# Patient Record
Sex: MALE | Race: WHITE | NOT HISPANIC OR LATINO | URBAN - METROPOLITAN AREA
[De-identification: names, ages, dates, MRNs, and addresses within clinical notes are randomized per-mention and may not be internally consistent; named-entity substitution may affect disease eponyms.]

---

## 2017-11-20 ENCOUNTER — EMERGENCY (EMERGENCY)
Facility: HOSPITAL | Age: 62
LOS: 1 days | Discharge: HOME | End: 2017-11-20
Attending: EMERGENCY MEDICINE | Admitting: EMERGENCY MEDICINE
Payer: MEDICAID

## 2017-11-20 VITALS
SYSTOLIC BLOOD PRESSURE: 150 MMHG | RESPIRATION RATE: 16 BRPM | DIASTOLIC BLOOD PRESSURE: 79 MMHG | HEART RATE: 100 BPM | TEMPERATURE: 99 F

## 2017-11-20 PROCEDURE — 99282 EMERGENCY DEPT VISIT SF MDM: CPT

## 2017-11-20 PROCEDURE — 99284 EMERGENCY DEPT VISIT MOD MDM: CPT

## 2017-11-20 NOTE — ED PROVIDER NOTE - NEUROLOGICAL, MLM
Alert and orientedx0, nonresponsive, no focal deficits, no motor deficits. exam limited by cooperation

## 2017-11-20 NOTE — ED PROVIDER NOTE - PROGRESS NOTE DETAILS
Pt seen by Ophthalmology resident. Exam limited by pt compliance. Resident to return with attending and will reexamine pt shortly. 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 They recommend dc with ocuflox, topical bacitracin, feliz drops,  and cornea followup tmrw.

## 2017-11-20 NOTE — ED PROVIDER NOTE - OBJECTIVE STATEMENT
62yM hx hypothyroid, CAD, HD, parkinson's, schizoaffective p/w 1d rt eye discharge, scratching to eye. Evaluated by MD and [facility] and noted to have rt corneal cloudiness, sent to ED to r/o keratitis vs. corneal abrasion. No other symptoms noted. Pt nonverbal, not answering questions, per [facility] staff this is pt's baseline sometimes.

## 2017-11-20 NOTE — ED PROVIDER NOTE - MEDICAL DECISION MAKING DETAILS
rt corneal haziness, unable to assess VA->ophtho consult r/o ant uveitis, keratitis, corneal abrasion

## 2017-11-20 NOTE — CONSULT NOTE ADULT - SUBJECTIVE AND OBJECTIVE BOX
[facility] Ophthalmology Consult Note    HPI: 62 y M hx hypothyroid, CAD, HD, parkinson's, schizoaffective p/w 1d redness OD. Largely nonverbal.    PMH: As above  Meds: None  POcHx (including surgeries/lasers/trauma):  None  Drops: None  FamHx: None  Social Hx: None  Allergies: NKDA    ROS:  General (neg), Vision (per HPI), Head and Neck (neg), Pulm (neg), CV (neg), GI (neg),  (neg), Musculoskeletal (neg), Skin/Integ (neg), Neuro (neg), Endocrine (neg), Heme (neg), All/Immuno (neg)    Mood and Affect Appropriate ( x ),  Oriented to Time, Place, and Person x 3 ( x )    Ophthalmology Exam    Visual acuity (sc): Unable to assess 2/2 nonverbal and limited cooperation  Pupils: PERRL OU  Ttono: STP OU  Extraocular movements (EOMs): Intact OU  Confrontational Visual Field (CVF):  Unable to assess 2/2 nonverbal and limited cooperation  Color Plates: Unable to assess 2/2 nonverbal and limited cooperation    Pen Light Exam (PLE)  External:  Flat OU  Lids/Lashes/Lacrimal Ducts: Flat OU    Sclera/Conjunctiva:  W+Q OU  Cornea: OD: 4-5mm central corneal bulge anteriorly, opacity, Rizzuti sign. OS 1mm central bulge anteriorly, no opactiy. No stained due to lack of cooperation.  Anterior Chamber: D+F OU  Iris:  Flat OU  Lens: Cataract OU    Fundus Exam: dilated with 1% tropicamide and 2.5% phenylephrine  Unable to assess 2/2 nonverbal and limited cooperation    Assessment: 62 y M hx hypothyroid, CAD, HD, parkinson's, schizoaffective p/w 1d redness OD. Very limited exam as patient is noncooperative and aggressive. OD has 1+ injection, 4-5mm central corneal bulge with opacity. Appears that patient has keratoconus with acute corneal hydrops OD. Cannot exclude infectious keratitis OD 2/2 limited exam. Likely old hydrops OS.    Plan:  Yury drops bid OD  Ocuflox q4hr OD  bacitracin ointment qhs OD    Follow-Up:  Patient should follow up with cornea tomorrow  [address]  [place] [address]  [phone]    S/D/W Dr Farley (attending) [facility] Ophthalmology Consult Note    HPI: 62 y M hx hypothyroid, CAD, HD, parkinson's, schizoaffective p/w 1d redness OD. Largely nonverbal.    PMH: As above  Meds: None  POcHx (including surgeries/lasers/trauma):  None  Drops: None  FamHx: None  Social Hx: None  Allergies: NKDA    ROS:  General (neg), Vision (per HPI), Head and Neck (neg), Pulm (neg), CV (neg), GI (neg),  (neg), Musculoskeletal (neg), Skin/Integ (neg), Neuro (neg), Endocrine (neg), Heme (neg), All/Immuno (neg)    Mood and Affect Appropriate ( x ),  Oriented to Time, Place, and Person x 3 ( x )    Ophthalmology Exam    Pt extremely combative.  Kicking and trying to hit.  Had to be held down by 3 people to be examined.  Would not cooperate with slit lamp exam or pen light exam.  External exam performed only.  Pt refused flourescein drops and refused DFE.    Visual acuity (sc): Unable to assess 2/2 nonverbal and limited cooperation. F and F OU  Pupils: PERRL OU  Ttono: STP OU  Extraocular movements (EOMs): Intact OU  Confrontational Visual Field (CVF):  Unable to assess 2/2 nonverbal and limited cooperation  Color Plates: Unable to assess 2/2 nonverbal and limited cooperation    Pen Light Exam (PLE)  External:  Flat OU  Lids/Lashes/Lacrimal Ducts: Flat OU    Sclera/Conjunctiva:  W+Q OU  Cornea: OD: 4-5mm central corneal bulge anteriorly, opacity, Rizzuti sign. OS 1mm central bulge anteriorly, no opactiy. No stained due to lack of cooperation.  Anterior Chamber: D+F OU  Iris:  Flat OU  Lens: Cataract OU    Fundus Exam: dilated with 1% tropicamide and 2.5% phenylephrine  Unable to assess 2/2 nonverbal and limited cooperation

## 2017-11-20 NOTE — ED PROVIDER NOTE - CARE PLAN
Principal Discharge DX:	Keratoconus, acute hydrops, right  Instructions for follow-up, activity and diet:	The patient was given verbal and written discharge instructions. Specifically, instructions when to return to the ED and when to seek follow-up from their pcp was discussed. Any specialty follow-up was discussed, including how to make an appointment.  Instructions were discussed in simple, plain language and was understood by the patient. The patient understands that should their symptoms worsen or any new symptoms arise, they should return to the ED immediately for further evaluation.

## 2017-11-20 NOTE — ED PROVIDER NOTE - EYES, MLM
unable to assess VA 2/2 poor pt compliance with exam, fluorescein stain revealed no abrasion but exam limted 2/2 poor compliance (pt squeezing eyes shut), bl eyes with minimal yellow-green crusting, rt eye with opaque haziness overlying cornea

## 2017-11-20 NOTE — CONSULT NOTE ADULT - ATTENDING COMMENTS
I have interviewed and examined the patient and reviewed the residents note including the history, exam, assessment, and plan.  I agree with the residents assessment and plan.    Assessment: 62 y M hx hypothyroid, CAD, HD, parkinson's, schizoaffective p/w 1d redness OD. Very limited exam as patient is noncooperative and aggressive. OD has 1+ injection, 4-5mm central corneal bulge with opacity. Appears that patient has likely keratoconus with acute corneal hydrops OD. Cannot exclude infectious keratitis OD 2/2 limited exam. Likely old hydrops OS.    Plan:  Yury drops bid OD  Ocuflox 6x/day OD  bacitracin ointment qhs OD  no eye rubbing    Follow-Up:  Patient should follow up with cornea tomorrow    Gina Ferraro MD

## 2017-11-20 NOTE — CONSULT NOTE ADULT - ASSESSMENT
Assessment: 62 y M hx hypothyroid, CAD, HD, parkinson's, schizoaffective p/w 1d redness OD. Very limited exam as patient is noncooperative and aggressive. OD has 1+ injection, 4-5mm central corneal bulge with opacity. Appears that patient has likely keratoconus with acute corneal hydrops OD. Cannot exclude infectious keratitis OD 2/2 limited exam. Likely old hydrops OS.    Plan:  Yury drops bid OD  Ocuflox 6x/day OD  bacitracin ointment qhs OD  no eye rubbing    Follow-Up:  Patient should follow up with cornea tomorrow  [address]  [place] [address]  [phone]    S/D/W Dr Farley (attending)

## 2017-11-22 ENCOUNTER — APPOINTMENT (OUTPATIENT)
Dept: OPHTHALMOLOGY | Facility: CLINIC | Age: 62
End: 2017-11-22

## 2017-11-22 PROBLEM — Z00.00 ENCOUNTER FOR PREVENTIVE HEALTH EXAMINATION: Status: ACTIVE | Noted: 2017-11-22

## 2017-11-30 ENCOUNTER — APPOINTMENT (OUTPATIENT)
Dept: OPHTHALMOLOGY | Facility: CLINIC | Age: 62
End: 2017-11-30

## 2018-08-24 ENCOUNTER — MOBILE ON CALL (OUTPATIENT)
Age: 63
End: 2018-08-24

## 2019-10-29 ENCOUNTER — EMERGENCY (EMERGENCY)
Facility: HOSPITAL | Age: 64
LOS: 1 days | Discharge: HOME | End: 2019-10-29
Attending: HOSPITALIST | Admitting: HOSPITALIST
Payer: MEDICAID

## 2019-10-29 VITALS
RESPIRATION RATE: 16 BRPM | DIASTOLIC BLOOD PRESSURE: 58 MMHG | OXYGEN SATURATION: 100 % | SYSTOLIC BLOOD PRESSURE: 98 MMHG | TEMPERATURE: 99 F | HEART RATE: 66 BPM

## 2019-10-29 LAB
ALBUMIN SERPL ELPH-MCNC: 3.8 G/DL — SIGNIFICANT CHANGE UP (ref 3.3–5)
ALP SERPL-CCNC: 58 U/L — SIGNIFICANT CHANGE UP (ref 40–120)
ALT FLD-CCNC: 12 U/L — SIGNIFICANT CHANGE UP (ref 4–41)
ANION GAP SERPL CALC-SCNC: 11 MMO/L — SIGNIFICANT CHANGE UP (ref 7–14)
AST SERPL-CCNC: 17 U/L — SIGNIFICANT CHANGE UP (ref 4–40)
BASE EXCESS BLDV CALC-SCNC: 2.6 MMOL/L — SIGNIFICANT CHANGE UP
BASE EXCESS BLDV CALC-SCNC: 6.8 MMOL/L — SIGNIFICANT CHANGE UP
BASOPHILS # BLD AUTO: 0.01 K/UL — SIGNIFICANT CHANGE UP (ref 0–0.2)
BASOPHILS NFR BLD AUTO: 0.2 % — SIGNIFICANT CHANGE UP (ref 0–2)
BILIRUB SERPL-MCNC: 0.2 MG/DL — SIGNIFICANT CHANGE UP (ref 0.2–1.2)
BLOOD GAS VENOUS - CREATININE: 0.66 MG/DL — SIGNIFICANT CHANGE UP (ref 0.5–1.3)
BLOOD GAS VENOUS - CREATININE: SIGNIFICANT CHANGE UP MG/DL (ref 0.5–1.3)
BUN SERPL-MCNC: 21 MG/DL — SIGNIFICANT CHANGE UP (ref 7–23)
CALCIUM SERPL-MCNC: 8.9 MG/DL — SIGNIFICANT CHANGE UP (ref 8.4–10.5)
CHLORIDE BLDV-SCNC: 102 MMOL/L — SIGNIFICANT CHANGE UP (ref 96–108)
CHLORIDE BLDV-SCNC: 108 MMOL/L — SIGNIFICANT CHANGE UP (ref 96–108)
CHLORIDE SERPL-SCNC: 101 MMOL/L — SIGNIFICANT CHANGE UP (ref 98–107)
CO2 SERPL-SCNC: 27 MMOL/L — SIGNIFICANT CHANGE UP (ref 22–31)
CREAT SERPL-MCNC: 0.72 MG/DL — SIGNIFICANT CHANGE UP (ref 0.5–1.3)
EOSINOPHIL # BLD AUTO: 0.09 K/UL — SIGNIFICANT CHANGE UP (ref 0–0.5)
EOSINOPHIL NFR BLD AUTO: 1.7 % — SIGNIFICANT CHANGE UP (ref 0–6)
GAS PNL BLDV: 134 MMOL/L — LOW (ref 136–146)
GAS PNL BLDV: 139 MMOL/L — SIGNIFICANT CHANGE UP (ref 136–146)
GLUCOSE BLDV-MCNC: 89 MG/DL — SIGNIFICANT CHANGE UP (ref 70–99)
GLUCOSE BLDV-MCNC: 93 MG/DL — SIGNIFICANT CHANGE UP (ref 70–99)
GLUCOSE SERPL-MCNC: 112 MG/DL — HIGH (ref 70–99)
HCO3 BLDV-SCNC: 26 MMOL/L — SIGNIFICANT CHANGE UP (ref 20–27)
HCO3 BLDV-SCNC: 28 MMOL/L — HIGH (ref 20–27)
HCT VFR BLD CALC: 39 % — SIGNIFICANT CHANGE UP (ref 39–50)
HCT VFR BLDV CALC: 36.3 % — LOW (ref 39–51)
HCT VFR BLDV CALC: 39.3 % — SIGNIFICANT CHANGE UP (ref 39–51)
HGB BLD-MCNC: 12.2 G/DL — LOW (ref 13–17)
HGB BLDV-MCNC: 11.8 G/DL — LOW (ref 13–17)
HGB BLDV-MCNC: 12.8 G/DL — LOW (ref 13–17)
IMM GRANULOCYTES NFR BLD AUTO: 0.2 % — SIGNIFICANT CHANGE UP (ref 0–1.5)
LACTATE BLDV-MCNC: 1.9 MMOL/L — SIGNIFICANT CHANGE UP (ref 0.5–2)
LACTATE BLDV-MCNC: 2.2 MMOL/L — HIGH (ref 0.5–2)
LYMPHOCYTES # BLD AUTO: 1.24 K/UL — SIGNIFICANT CHANGE UP (ref 1–3.3)
LYMPHOCYTES # BLD AUTO: 23.5 % — SIGNIFICANT CHANGE UP (ref 13–44)
MCHC RBC-ENTMCNC: 29 PG — SIGNIFICANT CHANGE UP (ref 27–34)
MCHC RBC-ENTMCNC: 31.3 % — LOW (ref 32–36)
MCV RBC AUTO: 92.9 FL — SIGNIFICANT CHANGE UP (ref 80–100)
MONOCYTES # BLD AUTO: 0.69 K/UL — SIGNIFICANT CHANGE UP (ref 0–0.9)
MONOCYTES NFR BLD AUTO: 13.1 % — SIGNIFICANT CHANGE UP (ref 2–14)
NEUTROPHILS # BLD AUTO: 3.24 K/UL — SIGNIFICANT CHANGE UP (ref 1.8–7.4)
NEUTROPHILS NFR BLD AUTO: 61.3 % — SIGNIFICANT CHANGE UP (ref 43–77)
NRBC # FLD: 0 K/UL — SIGNIFICANT CHANGE UP (ref 0–0)
PCO2 BLDV: 48 MMHG — SIGNIFICANT CHANGE UP (ref 41–51)
PCO2 BLDV: 62 MMHG — HIGH (ref 41–51)
PH BLDV: 7.34 PH — SIGNIFICANT CHANGE UP (ref 7.32–7.43)
PH BLDV: 7.38 PH — SIGNIFICANT CHANGE UP (ref 7.32–7.43)
PLATELET # BLD AUTO: 138 K/UL — LOW (ref 150–400)
PMV BLD: 10.3 FL — SIGNIFICANT CHANGE UP (ref 7–13)
PO2 BLDV: 25 MMHG — LOW (ref 35–40)
PO2 BLDV: 44 MMHG — HIGH (ref 35–40)
POTASSIUM BLDV-SCNC: 5.2 MMOL/L — HIGH (ref 3.4–4.5)
POTASSIUM BLDV-SCNC: SIGNIFICANT CHANGE UP MMOL/L (ref 3.4–4.5)
POTASSIUM SERPL-MCNC: 4.8 MMOL/L — SIGNIFICANT CHANGE UP (ref 3.5–5.3)
POTASSIUM SERPL-SCNC: 4.8 MMOL/L — SIGNIFICANT CHANGE UP (ref 3.5–5.3)
PROT SERPL-MCNC: 6.7 G/DL — SIGNIFICANT CHANGE UP (ref 6–8.3)
RBC # BLD: 4.2 M/UL — SIGNIFICANT CHANGE UP (ref 4.2–5.8)
RBC # FLD: 14.2 % — SIGNIFICANT CHANGE UP (ref 10.3–14.5)
SAO2 % BLDV: 36.4 % — LOW (ref 60–85)
SAO2 % BLDV: 80 % — SIGNIFICANT CHANGE UP (ref 60–85)
SODIUM SERPL-SCNC: 139 MMOL/L — SIGNIFICANT CHANGE UP (ref 135–145)
WBC # BLD: 5.28 K/UL — SIGNIFICANT CHANGE UP (ref 3.8–10.5)
WBC # FLD AUTO: 5.28 K/UL — SIGNIFICANT CHANGE UP (ref 3.8–10.5)

## 2019-10-29 PROCEDURE — 99284 EMERGENCY DEPT VISIT MOD MDM: CPT

## 2019-10-29 RX ORDER — SODIUM CHLORIDE 9 MG/ML
1000 INJECTION INTRAMUSCULAR; INTRAVENOUS; SUBCUTANEOUS ONCE
Refills: 0 | Status: COMPLETED | OUTPATIENT
Start: 2019-10-29 | End: 2019-10-29

## 2019-10-29 RX ADMIN — SODIUM CHLORIDE 1000 MILLILITER(S): 9 INJECTION INTRAMUSCULAR; INTRAVENOUS; SUBCUTANEOUS at 19:38

## 2019-10-29 RX ADMIN — Medication 100 MILLIGRAM(S): at 18:25

## 2019-10-29 NOTE — ED ADULT NURSE REASSESSMENT NOTE - NS ED NURSE REASSESS COMMENT FT1
Patient with 2 staff members at his bedside. Medicated as ordered with Clindamycin , NS infusing well. Patient appears in no distress. Patient was incontinent of liquid stool x 1.

## 2019-10-29 NOTE — ED ADULT TRIAGE NOTE - CHIEF COMPLAINT QUOTE
A&OX1 person, PMH schizophrenia and parkinson's, arrived from [facility] Inpt unit 6b with aides (2:1), c/o left lower leg cellulitis, left lower leg appears red  calm in triage,

## 2019-10-29 NOTE — ED PROVIDER NOTE - NSFOLLOWUPINSTRUCTIONS_ED_ALL_ED_FT
If you experience increase pain or redness, fever, chills, purulent drainage to the site return to the ED immediately. Please take antibiotic as prescribed

## 2019-10-29 NOTE — ED ADULT NURSE NOTE - CHIEF COMPLAINT QUOTE
A&OX1 person, PMH schizophrenia and parkinson's, arrived from [facility] Inpt unit 6b with aides (2:1), c/o left lower leg cellulitis, left lower leg appears red  calm in triage,

## 2019-10-29 NOTE — ED ADULT NURSE NOTE - ED STAT RN HANDOFF DETAILS
report given to [facility] wagner. pt baseline emntal status. nad noted. dc accompanied by 2 [facility] staff.

## 2019-10-29 NOTE — ED PROVIDER NOTE - PATIENT PORTAL LINK FT
You can access the FollowMyHealth Patient Portal offered by [facility] by registering at the following website: [email or web address]. By joining [facility]’s FollowMyHealth portal, you will also be able to view your health information using other applications (apps) compatible with our system.

## 2019-10-29 NOTE — ED PROVIDER NOTE - OBJECTIVE STATEMENT
63yo M hx schizophrenia, parkinsons, hld, hypothyroid, AAOx1 baseline sent in from [facility] for cellulitis of dorsum of L foot noticed several days ago not improving with augmentin PO x 2 days. No fever, chills, decreased PO, change in mental status, n/v/d, or difficulty walking.

## 2019-10-29 NOTE — ED PROVIDER NOTE - PHYSICAL EXAMINATION
Gen: discheveled appearing, NAD  Head: NCAT  HEENT: PERRL, MMM, normal conjunctiva, anicteric, neck supple  Lung: CTAB, no adventitious sounds  CV: RRR, no murmurs  Abd: soft, NTND, no rebound or guarding, no CVAT  MSK: No edema, no visible deformities  Neuro: Moving all extremity grossly  Skin: Warm and dry, 1x1cm crusted lesion/ ?ulceration on L foot w/ surrounding erythema and mild induration to majority of dorsum of foot  Psych: normal mood and affect

## 2019-10-29 NOTE — ED PROVIDER NOTE - CLINICAL SUMMARY MEDICAL DECISION MAKING FREE TEXT BOX
Sent in for cellulitis not improving on augmentin x 2 days, nonpurulent cellulitis on foot. Mildly low bp but other vitals wnl. Labs, fluids, IV abx. If wnl and bp normalizes possibly dc otherwise admit for failure of PO abx

## 2019-10-29 NOTE — ED ADULT NURSE NOTE - OBJECTIVE STATEMENT
A&OX1 person, PMH schizophrenia and parkinson's, arrived from [facility] Inpt unit 6b with aides (2:1), c/o left lower leg cellulitis, left lower leg appears red  calm in triage,  cellulitis  Pt to room 14 with 2 aides at bedside. Pt is alert And active. Pt evaluated by MD. IVL placed to left hand 20 gauge and labs drawn and sent. IVL wrapped for safety placement. Pt waiting for results, further evaluation and dispo. Pt's left lower foot as well as leg has swelling and a couple of small ulcers. Will continue to monitor. Pt calm and cooperative at this time.    VITOR Macias

## 2019-10-29 NOTE — ED PROVIDER NOTE - PROGRESS NOTE DETAILS
PA Smartt: after liter of fluid lactate improved to 1.9. patient will be d/c back to [facility] with prescription for abx PA Smartt: after liter of fluid lactate improved to 1.9. patient will be d/c back to [facility] with prescription for abx. He is accompanied by peer counselors. social work with be contacted for discharge huddle

## 2019-10-29 NOTE — ED PROVIDER NOTE - ATTENDING CONTRIBUTION TO CARE
64M with hx of parkinsons and schizophrenia sent from inpatient [facility] 64M with hx of Parkinson's and schizophrenia sent from inpatient [facility] for cellulitis of left foot. patient treated with po Augmentin w/o improvement. no fevers, patient unable to contribute to HPI.  ***GEN - NAD; AAOx1 ***HEAD - NC/AT ***EYES/NOSE - PERRL, EOMI, mucous membranes moist, no discharge ***THROAT: Oral cavity and pharynx normal. No inflammation, swelling, exudate, or lesions.  ***NECK: Neck supple, non-tender without lymphadenopathy, no masses, no thyromegaly.   ***PULMONARY - CTA b/l, symmetric breath sounds. ***CARDIAC -s1s2, RRR, no M,G,R  ***ABDOMEN - +BS, ND, NT, soft, no guarding, no rebound, no masses   ***BACK - no CVA tenderness, Normal  spine ***EXTREMITIES - symmetric pulses, 2+ dp, capillary refill < 2 seconds, no clubbing, no cyanosis, no edema ***SKIN - redness and warmth to dorsum of left foot  ***NEUROLOGIC - alert, CN 2-12 intact, ***PSYCH - cooperative  MDM: 64M woth worsening cellulitis despite augmentin. xrays, abx., labs

## 2019-10-30 VITALS
OXYGEN SATURATION: 100 % | HEART RATE: 67 BPM | DIASTOLIC BLOOD PRESSURE: 80 MMHG | RESPIRATION RATE: 16 BRPM | SYSTOLIC BLOOD PRESSURE: 128 MMHG

## 2019-10-30 PROBLEM — I25.10 ATHEROSCLEROTIC HEART DISEASE OF NATIVE CORONARY ARTERY WITHOUT ANGINA PECTORIS: Chronic | Status: ACTIVE | Noted: 2017-11-20

## 2019-10-30 PROBLEM — G20 PARKINSON'S DISEASE: Chronic | Status: ACTIVE | Noted: 2017-11-20

## 2019-10-30 PROBLEM — F25.9 SCHIZOAFFECTIVE DISORDER, UNSPECIFIED: Chronic | Status: ACTIVE | Noted: 2017-11-20

## 2021-06-19 ENCOUNTER — EMERGENCY (EMERGENCY)
Facility: HOSPITAL | Age: 66
LOS: 1 days | Discharge: HOME | End: 2021-06-19
Attending: EMERGENCY MEDICINE | Admitting: EMERGENCY MEDICINE
Payer: MEDICAID

## 2021-06-19 VITALS
SYSTOLIC BLOOD PRESSURE: 112 MMHG | TEMPERATURE: 98 F | HEART RATE: 71 BPM | OXYGEN SATURATION: 98 % | DIASTOLIC BLOOD PRESSURE: 61 MMHG | RESPIRATION RATE: 18 BRPM

## 2021-06-19 VITALS
TEMPERATURE: 98 F | HEART RATE: 61 BPM | OXYGEN SATURATION: 96 % | SYSTOLIC BLOOD PRESSURE: 110 MMHG | DIASTOLIC BLOOD PRESSURE: 62 MMHG | RESPIRATION RATE: 17 BRPM

## 2021-06-19 LAB
ANION GAP SERPL CALC-SCNC: 14 MMOL/L — SIGNIFICANT CHANGE UP (ref 7–14)
BUN SERPL-MCNC: 14 MG/DL — SIGNIFICANT CHANGE UP (ref 7–23)
CALCIUM SERPL-MCNC: 8.7 MG/DL — SIGNIFICANT CHANGE UP (ref 8.4–10.5)
CHLORIDE SERPL-SCNC: 93 MMOL/L — LOW (ref 98–107)
CO2 SERPL-SCNC: 27 MMOL/L — SIGNIFICANT CHANGE UP (ref 22–31)
CREAT SERPL-MCNC: 0.68 MG/DL — SIGNIFICANT CHANGE UP (ref 0.5–1.3)
GLUCOSE SERPL-MCNC: 87 MG/DL — SIGNIFICANT CHANGE UP (ref 70–99)
HCT VFR BLD CALC: 36.9 % — LOW (ref 39–50)
HGB BLD-MCNC: 12.3 G/DL — LOW (ref 13–17)
MCHC RBC-ENTMCNC: 29.1 PG — SIGNIFICANT CHANGE UP (ref 27–34)
MCHC RBC-ENTMCNC: 33.3 GM/DL — SIGNIFICANT CHANGE UP (ref 32–36)
MCV RBC AUTO: 87.4 FL — SIGNIFICANT CHANGE UP (ref 80–100)
NRBC # BLD: 0 /100 WBCS — SIGNIFICANT CHANGE UP
NRBC # FLD: 0 K/UL — SIGNIFICANT CHANGE UP
PLATELET # BLD AUTO: 177 K/UL — SIGNIFICANT CHANGE UP (ref 150–400)
POTASSIUM SERPL-MCNC: 4.4 MMOL/L — SIGNIFICANT CHANGE UP (ref 3.5–5.3)
POTASSIUM SERPL-SCNC: 4.4 MMOL/L — SIGNIFICANT CHANGE UP (ref 3.5–5.3)
RBC # BLD: 4.22 M/UL — SIGNIFICANT CHANGE UP (ref 4.2–5.8)
RBC # FLD: 13.9 % — SIGNIFICANT CHANGE UP (ref 10.3–14.5)
SODIUM SERPL-SCNC: 134 MMOL/L — LOW (ref 135–145)
WBC # BLD: 6.61 K/UL — SIGNIFICANT CHANGE UP (ref 3.8–10.5)
WBC # FLD AUTO: 6.61 K/UL — SIGNIFICANT CHANGE UP (ref 3.8–10.5)

## 2021-06-19 PROCEDURE — 99284 EMERGENCY DEPT VISIT MOD MDM: CPT

## 2021-06-19 PROCEDURE — 70450 CT HEAD/BRAIN W/O DYE: CPT | Mod: 26

## 2021-06-19 RX ADMIN — Medication 2 MILLIGRAM(S): at 18:07

## 2021-06-19 NOTE — PROVIDER CONTACT NOTE (OTHER) - ASSESSMENT
Pt is being d/c, [facility] staff at bedside.  Called [facility] at [phone]; spoke with RN Julia; who reports she will speak with MD and get back to  re: provider hand off. Pt is being d/c, [facility] staff at bedside.  Called [facility] at [phone]; spoke with RN Julia; who reports she will speak with MD and get back to  re: provider hand off.  AS per [facility], provider is Dr. Simmons [phone]; Pt is being d/c, [facility] staff at bedside.  Called [facility] at [phone]; spoke with RN Julia; who reports she will speak with MD and get back to  re: provider hand off.  As per [facility], provider is Dr. Simmons [phone];  who accepted pt back to facility.  Discussed with provider, RN, and staff at bedside.

## 2021-06-19 NOTE — ED PROVIDER NOTE - ATTENDING CONTRIBUTION TO CARE
Attending note:   After face to face evaluation of this patient, I concur with above noted hx, pe, and care plan for this patient.  Tate: 66 yom with dementia and Parkinson's and schizophrenia, inpatient at [facility]. Pt noted to have abrasion on nose and left ear and EMS called. Initially noted to have low BP. EMS noted normal BP and BP here normal. Pt has no complaints but cannot provide answers to any questions. On exam, old bruise on left side of forehead and abrasions noted. FROM on neck and no tn, NC/AT, clear lungs, RRR, abd soft and non tender, moving all ext well, no edema, pulses equal and strong. CT head, labs, monitor BP.

## 2021-06-19 NOTE — ED PROVIDER NOTE - NSFOLLOWUPINSTRUCTIONS_ED_ALL_ED_FT
You were seen for suspected head trauma.     No signs of emergency medical condition on today's workup.  Your results are attached with your discharge instructions, please review them with your primary care physician. If there is a result pending, you will receive a call if test is positive.    A presumptive diagnosis is made today, but further evaluation may be required by your primary care doctor and/or specialist for a definitive diagnosis. Therefore, follow up as directed and if symptoms change/worsen or any emergency conditions, please return to the ER.    For pain or fever you can ibuprofen (motrin, advil) or tylenol as needed, as directed on packaging.    If needed, call patient access services at [phone] to find a primary care doctor, or call at [phone] to make an appointment at the clinic.
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
## 2021-06-19 NOTE — PROVIDER CONTACT NOTE (OTHER) - SITUATION
Notified by provider that pt is ready for d/c; pt is returning to [facility] Notified by provider that pt is ready for d/c; pt is returning to [facility], unit 6B  [phone].

## 2021-06-19 NOTE — PROVIDER CONTACT NOTE (OTHER) - RECOMMENDATIONS
Transport to be arranged by [facility] Transport to be arranged by [facility] via staff at bedside, Mr. Burden.  Verbal huddle complete.

## 2021-06-19 NOTE — ED PROVIDER NOTE - CLINICAL SUMMARY MEDICAL DECISION MAKING FREE TEXT BOX
65 yo Mw/ a PMHx of dementia (verbal, but nonsensical), schizophrenia, Parkinson's, HLD, hypothyroid, sent in from [facility] after they noted L ear contusion w/ abrasion and hypotension (88/54). Patient normotensive and afebrile here. Check CBC, BMP, UA, CTH.

## 2021-06-19 NOTE — ED ADULT NURSE NOTE - OBJECTIVE STATEMENT
Pt received to room 16. Pt comes to ED from [facility], 1 staff member at bedside, with a small abrasion noted to pt's nose. Pt unable to verbalize how he got the small abrasion. Staff member at bedside does not know if pt fell. Pt is verbally and physically aggressive with ED staff when attempting vital signs and blood draw. Security called to bedside. Respirations are even & unlabored.

## 2021-06-19 NOTE — ED PROVIDER NOTE - PROGRESS NOTE DETAILS
Charlotte Ellison DO PGY-2: pt received as a sign out. No new complains at this time. UA was ordered by the day team because pt was hypotensive in the facility. Pt has been normotensive in the ER. Plan at sign out - only pendind CT head - if negative dc home. As per the staff member pt never had mental status change.   Will dc patient. SW is contacted.

## 2021-06-19 NOTE — ED ADULT NURSE NOTE - NSIMPLEMENTINTERV_GEN_ALL_ED
Implemented All Fall Risk Interventions:  [place] to call system. Call bell, personal items and telephone within reach. Instruct patient to call for assistance. Room bathroom lighting operational. Non-slip footwear when patient is off stretcher. Physically safe environment: no spills, clutter or unnecessary equipment. Stretcher in lowest position, wheels locked, appropriate side rails in place. Provide visual cue, wrist band, yellow gown, etc. Monitor gait and stability. Monitor for mental status changes and reorient to person, place, and time. Review medications for side effects contributing to fall risk. Reinforce activity limits and safety measures with patient and family.

## 2021-06-19 NOTE — PROVIDER CONTACT NOTE (OTHER) - BACKGROUND
Pt currently resides at [facility] Unit [address]; transportation back is needed Pt currently resides at [facility] inpatient Unit 6B; transportation back is needed; Staff at bedside, Mr. Burden will accompany pt back to [facility].

## 2021-06-19 NOTE — ED PROVIDER NOTE - OBJECTIVE STATEMENT
65 yo Mw/ a PMHx of dementia (verbal, but nonsensical), schizophrenia, Parkinson's, HLD, hypothyroid, sent in from [facility] after they noted L ear contusion w/ abrasion and hypotension (88/54). Spoke to [facility] worker, it is unknown if he fell. Upon interview, he is not speaking relevant to the conversation which the worker notes is chronic stable baseline.

## 2021-06-19 NOTE — ED PROVIDER NOTE - PATIENT PORTAL LINK FT
You can access the FollowMyHealth Patient Portal offered by [facility] by registering at the following website: [email or web address]. By joining [facility]’s FollowMyHealth portal, you will also be able to view your health information using other applications (apps) compatible with our system.
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
## 2021-06-19 NOTE — ED ADULT NURSE REASSESSMENT NOTE - NS ED NURSE REASSESS COMMENT FT1
Pt escorted via wheelchair to ED entrance where transportation provided by [facility] was waiting for pt. [facility] staff accompanied the patient.
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
## 2022-03-11 NOTE — ED PROVIDER NOTE - ATTENDING CONTRIBUTION TO CARE
agree with resident note  62yM hx hypothyroid, CAD, HD, parkinson's, schizoaffective who presents from psychiatric facility for right eye discharge and possibly pain.  Pt not speaking on exam agitated will not allow us to examine him.  Able to place tetracaine and perform brief exam    PE: HR of 100; well appearing; not toxic; right eye (whitish hue in anterior chamber evidence on brief exam); no clear uptake; unable to perform visual acuity as pt not speaking or fundoscopic;     optho resident and attending at bedside they also can perform a limited exam and on their exam pt has keratoconus hydrops Azathioprine Pregnancy And Lactation Text: This medication is Pregnancy Category D and isn't considered safe during pregnancy. It is unknown if this medication is excreted in breast milk.

## 2022-12-15 ENCOUNTER — EMERGENCY (EMERGENCY)
Facility: HOSPITAL | Age: 67
LOS: 1 days | Discharge: HOME | End: 2022-12-15
Attending: EMERGENCY MEDICINE | Admitting: EMERGENCY MEDICINE

## 2022-12-15 VITALS
OXYGEN SATURATION: 97 % | DIASTOLIC BLOOD PRESSURE: 56 MMHG | RESPIRATION RATE: 18 BRPM | TEMPERATURE: 97 F | HEART RATE: 73 BPM | SYSTOLIC BLOOD PRESSURE: 109 MMHG

## 2022-12-15 PROCEDURE — 99283 EMERGENCY DEPT VISIT LOW MDM: CPT

## 2022-12-15 NOTE — ED PROVIDER NOTE - PATIENT PORTAL LINK FT
You can access the FollowMyHealth Patient Portal offered by [facility] by registering at the following website: [email or web address]. By joining [employer]’s FollowMyHealth portal, you will also be able to view your health information using other applications (apps) compatible with our system.

## 2022-12-15 NOTE — ED PROVIDER NOTE - NSICDXPASTMEDICALHX_GEN_ALL_CORE_FT
PAST MEDICAL HISTORY:  CAD (coronary artery disease)     Parkinson disease     Schizoaffective disorder
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
## 2022-12-15 NOTE — ED PROVIDER NOTE - PHYSICAL EXAMINATION
GENERAL: no acute distress, non-toxic appearing  HEAD: normocephalic, atraumatic  HEENT: normal conjunctiva, oral mucosa moist, neck supple  CARDIAC: alert at bedside  PULM: breathing comfortably at bedside  GI: abdomen nondistended, soft, nontender, no guarding or rebound tenderness  Rectal Exam: chaperoned with Dr. Chris: no rectum grossly unremarkable, no external signs of trauma, no bleeding or tears noted  : no CVA tenderness, no suprapubic tenderness  NEURO: alert and oriented x 0, word salad, unable to answer questions properly  MSK: no visible deformities, no peripheral edema, calf tenderness/redness/swelling  SKIN: no visible rashes, dry, well-perfused
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
## 2022-12-15 NOTE — ED ADULT NURSE NOTE - CHIEF COMPLAINT
reproducible rib pain possible msk/chostochondritis ; also notes pleuritic in nature will fu labs d-dimer 1 trop no fmhx of cad unlikely cardiac no risk factors no pericarditis on ekg will check esr/crp trop; no recent illness- tx with ibuprofen reassess
The patient is a 67y Male complaining of see chief complaint quote.
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
## 2022-12-15 NOTE — ED PROVIDER NOTE - PROGRESS NOTE DETAILS
Patient extensively discussed with Justice, ICU, and social work.  Social work coordinating care with Darren and a provider to provider handoff was given.  If patient clinical status changes, premature instructed to bring patient back to the emergency room for further evaluation.

## 2022-12-15 NOTE — ED PROVIDER NOTE - NSFOLLOWUPINSTRUCTIONS_ED_ALL_ED_FT
-You were seen in the Emergency Department (ED) for general medical exam. Lab and imaging results, if performed, were discussed with you along with your discharge diagnosis.    FOLLOW-UP:  -Please follow up with your PMD if symptoms return or for any concerning matter pertaining to your general medical exam.  -Please follow up with your private physician within the next 72 hours. Tell them you were recently in the ED for an urgent issue and would like to be seen. Bring copies of your results if you were given.   -If you do not have a PMD, please call [phone] to find one convenient for you or call our clinic at [phone].    MEDICATIONS:  -Continue all other prescribed medicine, IF ANY, as per your primary care doctor's (PMD) recommendations.    PAIN CONTROL:  -Please take over the counter Tylenol (also known as acetaminophen) 650mg every 6 hours or Ibuprofen (also known as motrin, advil) 600mg every 8 hour for your pain, IF ANY, unless you are not supposed to for any reason.  -Rest, stay hydrated with plenty of fluids (drink at least 2 Liters or 64 Ounces of water each day UNLESS you are supposed to restrict fluids or ANY reason.    RETURN PRECAUTIONS:  -Please return to the Emergency Department if you experience ANY new or concerning symptoms, such as, but not limited to: worsening pain, large amount of bleeding, passing out, fever >100.F, shaking chills, inability to see or new double vision, chest pain, difficulty breathing, diffuse abdominal pain, unable to eat or drink, continuous vomiting or diarrhea, unable to move or feel part of your body

## 2024-01-02 NOTE — ED PROVIDER NOTE - EYES [+], MLM
Cyndie Hassan MD Rejman, M Fp Nurse Msg Pool14 minutes ago (9:17 AM)     No labs needed      eye discharge, scratching eye

## 2024-11-02 ENCOUNTER — EMERGENCY (EMERGENCY)
Facility: HOSPITAL | Age: 69
LOS: 1 days | Discharge: HOME | End: 2024-11-02
Attending: STUDENT IN AN ORGANIZED HEALTH CARE EDUCATION/TRAINING PROGRAM | Admitting: STUDENT IN AN ORGANIZED HEALTH CARE EDUCATION/TRAINING PROGRAM
Payer: MEDICAID

## 2024-11-02 VITALS
DIASTOLIC BLOOD PRESSURE: 67 MMHG | TEMPERATURE: 98 F | OXYGEN SATURATION: 99 % | SYSTOLIC BLOOD PRESSURE: 115 MMHG | RESPIRATION RATE: 18 BRPM | HEART RATE: 85 BPM

## 2024-11-02 LAB
ALBUMIN SERPL ELPH-MCNC: 3.4 G/DL — SIGNIFICANT CHANGE UP (ref 3.3–5)
ALP SERPL-CCNC: 62 U/L — SIGNIFICANT CHANGE UP (ref 40–120)
ALT FLD-CCNC: 15 U/L — SIGNIFICANT CHANGE UP (ref 4–41)
ANION GAP SERPL CALC-SCNC: 10 MMOL/L — SIGNIFICANT CHANGE UP (ref 7–14)
AST SERPL-CCNC: 43 U/L — HIGH (ref 4–40)
BASOPHILS # BLD AUTO: 0.01 K/UL — SIGNIFICANT CHANGE UP (ref 0–0.2)
BASOPHILS NFR BLD AUTO: 0.2 % — SIGNIFICANT CHANGE UP (ref 0–2)
BILIRUB SERPL-MCNC: 0.2 MG/DL — SIGNIFICANT CHANGE UP (ref 0.2–1.2)
BUN SERPL-MCNC: 20 MG/DL — SIGNIFICANT CHANGE UP (ref 7–23)
CALCIUM SERPL-MCNC: 8.6 MG/DL — SIGNIFICANT CHANGE UP (ref 8.4–10.5)
CHLORIDE SERPL-SCNC: 99 MMOL/L — SIGNIFICANT CHANGE UP (ref 98–107)
CO2 SERPL-SCNC: 25 MMOL/L — SIGNIFICANT CHANGE UP (ref 22–31)
CREAT SERPL-MCNC: 0.75 MG/DL — SIGNIFICANT CHANGE UP (ref 0.5–1.3)
CRP SERPL-MCNC: 8 MG/L — HIGH
EGFR: 96 ML/MIN/1.73M2 — SIGNIFICANT CHANGE UP
EOSINOPHIL # BLD AUTO: 0.11 K/UL — SIGNIFICANT CHANGE UP (ref 0–0.5)
EOSINOPHIL NFR BLD AUTO: 1.7 % — SIGNIFICANT CHANGE UP (ref 0–6)
ERYTHROCYTE [SEDIMENTATION RATE] IN BLOOD: 8 MM/HR — SIGNIFICANT CHANGE UP (ref 1–15)
GLUCOSE SERPL-MCNC: 90 MG/DL — SIGNIFICANT CHANGE UP (ref 70–99)
HCT VFR BLD CALC: 35.5 % — LOW (ref 39–50)
HGB BLD-MCNC: 11.7 G/DL — LOW (ref 13–17)
IANC: 3.88 K/UL — SIGNIFICANT CHANGE UP (ref 1.8–7.4)
IMM GRANULOCYTES NFR BLD AUTO: 0.2 % — SIGNIFICANT CHANGE UP (ref 0–0.9)
LYMPHOCYTES # BLD AUTO: 1.7 K/UL — SIGNIFICANT CHANGE UP (ref 1–3.3)
LYMPHOCYTES # BLD AUTO: 26.6 % — SIGNIFICANT CHANGE UP (ref 13–44)
MCHC RBC-ENTMCNC: 28.4 PG — SIGNIFICANT CHANGE UP (ref 27–34)
MCHC RBC-ENTMCNC: 33 G/DL — SIGNIFICANT CHANGE UP (ref 32–36)
MCV RBC AUTO: 86.2 FL — SIGNIFICANT CHANGE UP (ref 80–100)
MONOCYTES # BLD AUTO: 0.69 K/UL — SIGNIFICANT CHANGE UP (ref 0–0.9)
MONOCYTES NFR BLD AUTO: 10.8 % — SIGNIFICANT CHANGE UP (ref 2–14)
NEUTROPHILS # BLD AUTO: 3.88 K/UL — SIGNIFICANT CHANGE UP (ref 1.8–7.4)
NEUTROPHILS NFR BLD AUTO: 60.5 % — SIGNIFICANT CHANGE UP (ref 43–77)
NRBC # BLD: 0 /100 WBCS — SIGNIFICANT CHANGE UP (ref 0–0)
NRBC # FLD: 0 K/UL — SIGNIFICANT CHANGE UP (ref 0–0)
PLATELET # BLD AUTO: 150 K/UL — SIGNIFICANT CHANGE UP (ref 150–400)
POTASSIUM SERPL-MCNC: 6.1 MMOL/L — HIGH (ref 3.5–5.3)
POTASSIUM SERPL-SCNC: 6.1 MMOL/L — HIGH (ref 3.5–5.3)
PROT SERPL-MCNC: 6.9 G/DL — SIGNIFICANT CHANGE UP (ref 6–8.3)
RBC # BLD: 4.12 M/UL — LOW (ref 4.2–5.8)
RBC # FLD: 14.3 % — SIGNIFICANT CHANGE UP (ref 10.3–14.5)
SODIUM SERPL-SCNC: 134 MMOL/L — LOW (ref 135–145)
WBC # BLD: 6.4 K/UL — SIGNIFICANT CHANGE UP (ref 3.8–10.5)
WBC # FLD AUTO: 6.4 K/UL — SIGNIFICANT CHANGE UP (ref 3.8–10.5)

## 2024-11-02 PROCEDURE — 93971 EXTREMITY STUDY: CPT | Mod: 26,LT

## 2024-11-02 PROCEDURE — 99285 EMERGENCY DEPT VISIT HI MDM: CPT

## 2024-11-02 PROCEDURE — 73630 X-RAY EXAM OF FOOT: CPT | Mod: 26,LT

## 2024-11-02 RX ORDER — HALOPERIDOL DECANOATE 50 MG/ML
5 INJECTION INTRAMUSCULAR ONCE
Refills: 0 | Status: COMPLETED | OUTPATIENT
Start: 2024-11-02 | End: 2024-11-02

## 2024-11-02 RX ORDER — CEFTRIAXONE SODIUM 10 G
1000 VIAL (EA) INJECTION ONCE
Refills: 0 | Status: COMPLETED | OUTPATIENT
Start: 2024-11-02 | End: 2024-11-02

## 2024-11-02 RX ORDER — LORAZEPAM 2 MG
2 TABLET ORAL ONCE
Refills: 0 | Status: DISCONTINUED | OUTPATIENT
Start: 2024-11-02 | End: 2024-11-02

## 2024-11-02 RX ADMIN — Medication 100 MILLIGRAM(S): at 21:33

## 2024-11-02 RX ADMIN — Medication 2 MILLIGRAM(S): at 22:21

## 2024-11-02 RX ADMIN — HALOPERIDOL DECANOATE 5 MILLIGRAM(S): 50 INJECTION INTRAMUSCULAR at 20:56

## 2024-11-02 NOTE — ED PROVIDER NOTE - PROGRESS NOTE DETAILS
Ivis Vu MD (PGY-3 EM): [facility] agreeable for dc w/ plan to give oral abx at [facility].
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
## 2024-11-02 NOTE — ED ADULT TRIAGE NOTE - CHIEF COMPLAINT QUOTE
Pt brought in from [facility], by EMS with c/o L foot swelling. Pt denies any injury. Hx of schizoaffective disorder and parkinson's. Pt unable to tolerate PO temperature.

## 2024-11-02 NOTE — ED ADULT NURSE NOTE - CHIEF COMPLAINT QUOTE
Pt brought in from [facility], by EMS with c/o L foot swelling. Pt denies any injury. Hx of schizoaffective disorder and parkinson's. Pt unable to tolerate PO temperature.

## 2024-11-02 NOTE — ED PROVIDER NOTE - NSFOLLOWUPINSTRUCTIONS_ED_ALL_ED_FT
YOU WERE FOUND TO HAVE CELLULITIS. PLEASE TAKE:    keflex 500mg, every 6hours, for 10 day.    Cellulitis    Cellulitis is a skin infection caused by bacteria. This condition occurs most often in the arms and lower legs but can occur anywhere over the body. Symptoms include redness, swelling, warm skin, tenderness, and chills/fever. If you were prescribed an antibiotic medicine, take it as told by your health care provider. Do not stop taking the antibiotic even if you start to feel better.    SEEK IMMEDIATE MEDICAL CARE IF YOU HAVE ANY OF THE FOLLOWING SYMPTOMS: worsening fever, red streaks coming from affected area, vomiting or diarrhea, or dizziness/lightheadedness.

## 2024-11-02 NOTE — ED ADULT NURSE NOTE - NSFALLRISKINTERV_ED_ALL_ED
Assistance OOB with selected safe patient handling equipment if applicable/Assistance with ambulation/Communicate fall risk and risk factors to all staff, patient, and family/Monitor gait and stability/Provide visual cue: yellow wristband, yellow gown, etc/Reinforce activity limits and safety measures with patient and family/Call bell, personal items and telephone in reach/Instruct patient to call for assistance before getting out of bed/chair/stretcher/Non-slip footwear applied when patient is off stretcher/[place] to call system/Physically safe environment - no spills, clutter or unnecessary equipment/Purposeful Proactive Rounding/Room/bathroom lighting operational, light cord in reach

## 2024-11-02 NOTE — ED PROVIDER NOTE - CLINICAL SUMMARY MEDICAL DECISION MAKING FREE TEXT BOX
69-year-old male past medical history significant for hypothyroidism, schizoaffective disorder, hyperlipidemia, atherosclerotic coronary artery disease, Parkinson's disease, dysphagia, frontotemporal dementia presents emergency department from [facility] secondary to left foot swelling. patient transferred to [facility] x 2 days ago and was noticed to have swelling. no known trauma. no history of dvt, not on blood thinners from chart review. history provided by triage note, [facility] staff, chart review. PE: patient seen at bedside, w/ [facility] staff, pleasently demented, incoherent, evidence of foot on shirt and mouth, able to range all ext, abd non tender, LLE > RLE with 1+ edema, peripheral pulses intact, no erythema. will get duplex to eval dvt. no need for labs at this time. no concern for cellulitis. 69-year-old male past medical history significant for hypothyroidism, schizoaffective disorder, hyperlipidemia, atherosclerotic coronary artery disease, Parkinson's disease, dysphagia, frontotemporal dementia presents emergency department from [facility] secondary to left foot swelling. patient transferred to [facility] x 2 days ago and was noticed to have swelling. no known trauma. no history of dvt, not on blood thinners from chart review. history provided by triage note, [facility] staff, chart review. PE: patient seen at bedside, w/ [facility] staff, pleasently demented, incoherent, evidence of foot on shirt and mouth, able to range all ext, abd non tender, LLE > RLE with 1+ edema, peripheral pulses intact, mild erythema to L#5 toe, with evidence of small chronic wound w/ scab.  will get duplex to eval dvt. will get screening labs, give abx for cellulitis, will get XR to eval frx. dispo pending workup. 69-year-old male past medical history significant for hypothyroidism, schizoaffective disorder, hyperlipidemia, atherosclerotic coronary artery disease, Parkinson's disease, dysphagia, frontotemporal dementia presents emergency department from [facility] secondary to left foot swelling. patient transferred to [facility] x 2 days ago and was noticed to have swelling. no known trauma. no history of dvt, not on blood thinners from chart review. history provided by triage note, [facility] staff, chart review. PE: patient seen at bedside, w/ [facility] staff, pleasantly demented, incoherent, evidence of foot on shirt and mouth, able to range all ext, evidence of large, non tender, soft, non erythematous mass on L elbow, abd non tender, LLE > RLE with 1+ edema, peripheral pulses intact, mild erythema to L#5 toe, with evidence of small chronic wound w/ scab.  will get duplex to eval dvt. will get screening labs, give abx for cellulitis, will get XR to eval frx. dispo pending workup.

## 2024-11-02 NOTE — ED PROVIDER NOTE - PATIENT PORTAL LINK FT
You can access the FollowMyHealth Patient Portal offered by [facility] by registering at the following website: [email or web address]. By joining [employer]’s FollowMyHealth portal, you will also be able to view your health information using other applications (apps) compatible with our system.

## 2024-11-03 LAB
ANION GAP SERPL CALC-SCNC: 10 MMOL/L — SIGNIFICANT CHANGE UP (ref 7–14)
BUN SERPL-MCNC: 18 MG/DL — SIGNIFICANT CHANGE UP (ref 7–23)
CALCIUM SERPL-MCNC: 8.4 MG/DL — SIGNIFICANT CHANGE UP (ref 8.4–10.5)
CHLORIDE SERPL-SCNC: 98 MMOL/L — SIGNIFICANT CHANGE UP (ref 98–107)
CO2 SERPL-SCNC: 27 MMOL/L — SIGNIFICANT CHANGE UP (ref 22–31)
CREAT SERPL-MCNC: 0.73 MG/DL — SIGNIFICANT CHANGE UP (ref 0.5–1.3)
EGFR: 97 ML/MIN/1.73M2 — SIGNIFICANT CHANGE UP
GLUCOSE SERPL-MCNC: 84 MG/DL — SIGNIFICANT CHANGE UP (ref 70–99)
POTASSIUM SERPL-MCNC: 4.5 MMOL/L — SIGNIFICANT CHANGE UP (ref 3.5–5.3)
POTASSIUM SERPL-SCNC: 4.5 MMOL/L — SIGNIFICANT CHANGE UP (ref 3.5–5.3)
SODIUM SERPL-SCNC: 135 MMOL/L — SIGNIFICANT CHANGE UP (ref 135–145)

## 2024-11-03 NOTE — PROVIDER CONTACT NOTE (OTHER) - ASSESSMENT
MSW made call to Pt's residence and spoke with the Nursing Supervisor Ms. Schreiber at [phone]. MIHAI was informed Pt can return to his residence at [address], unit contact number [phone], via ambulance.

## 2024-11-03 NOTE — PROVIDER CONTACT NOTE (OTHER) - ACTION/TREATMENT ORDERED:
[employer] [id number] arranged ambulance transportation for Pt with [facility]# was not provided to [facility] by [facility].  time is 230am.

## 2025-07-17 ENCOUNTER — INPATIENT (INPATIENT)
Facility: HOSPITAL | Age: 70
LOS: 4 days | Discharge: PSYCHIATRIC OR SUBSTANCE USE FACILITY | End: 2025-07-22
Attending: HOSPITALIST | Admitting: HOSPITALIST
Payer: MEDICAID

## 2025-07-17 VITALS
RESPIRATION RATE: 16 BRPM | DIASTOLIC BLOOD PRESSURE: 62 MMHG | HEART RATE: 67 BPM | SYSTOLIC BLOOD PRESSURE: 105 MMHG | OXYGEN SATURATION: 100 % | WEIGHT: 179.9 LBS | TEMPERATURE: 99 F

## 2025-07-17 PROCEDURE — 99285 EMERGENCY DEPT VISIT HI MDM: CPT

## 2025-07-18 DIAGNOSIS — F25.9 SCHIZOAFFECTIVE DISORDER, UNSPECIFIED: ICD-10-CM

## 2025-07-18 DIAGNOSIS — J69.0 PNEUMONITIS DUE TO INHALATION OF FOOD AND VOMIT: ICD-10-CM

## 2025-07-18 DIAGNOSIS — I25.10 ATHEROSCLEROTIC HEART DISEASE OF NATIVE CORONARY ARTERY WITHOUT ANGINA PECTORIS: ICD-10-CM

## 2025-07-18 DIAGNOSIS — G20 PARKINSON'S DISEASE: ICD-10-CM

## 2025-07-18 LAB
ADD ON TEST-SPECIMEN IN LAB: SIGNIFICANT CHANGE UP
ADD ON TEST-SPECIMEN IN LAB: SIGNIFICANT CHANGE UP
ALBUMIN SERPL ELPH-MCNC: 3.4 G/DL — SIGNIFICANT CHANGE UP (ref 3.3–5)
ALP SERPL-CCNC: 53 U/L — SIGNIFICANT CHANGE UP (ref 40–120)
ALT FLD-CCNC: 7 U/L — SIGNIFICANT CHANGE UP (ref 4–41)
ANION GAP SERPL CALC-SCNC: 12 MMOL/L — SIGNIFICANT CHANGE UP (ref 7–14)
APTT BLD: 30 SEC — SIGNIFICANT CHANGE UP (ref 26.1–36.8)
AST SERPL-CCNC: 13 U/L — SIGNIFICANT CHANGE UP (ref 4–40)
B PERT DNA SPEC QL NAA+PROBE: SIGNIFICANT CHANGE UP
B PERT+PARAPERT DNA PNL SPEC NAA+PROBE: SIGNIFICANT CHANGE UP
BASOPHILS # BLD AUTO: 0.01 K/UL — SIGNIFICANT CHANGE UP (ref 0–0.2)
BASOPHILS NFR BLD AUTO: 0.2 % — SIGNIFICANT CHANGE UP (ref 0–2)
BILIRUB SERPL-MCNC: 0.2 MG/DL — SIGNIFICANT CHANGE UP (ref 0.2–1.2)
BLOOD GAS VENOUS COMPREHENSIVE RESULT: SIGNIFICANT CHANGE UP
BUN SERPL-MCNC: 23 MG/DL — SIGNIFICANT CHANGE UP (ref 7–23)
C PNEUM DNA SPEC QL NAA+PROBE: SIGNIFICANT CHANGE UP
CALCIUM SERPL-MCNC: 8.4 MG/DL — SIGNIFICANT CHANGE UP (ref 8.4–10.5)
CHLORIDE SERPL-SCNC: 97 MMOL/L — LOW (ref 98–107)
CO2 SERPL-SCNC: 29 MMOL/L — SIGNIFICANT CHANGE UP (ref 22–31)
CREAT SERPL-MCNC: 0.7 MG/DL — SIGNIFICANT CHANGE UP (ref 0.5–1.3)
EGFR: 98 ML/MIN/1.73M2 — SIGNIFICANT CHANGE UP
EGFR: 98 ML/MIN/1.73M2 — SIGNIFICANT CHANGE UP
EOSINOPHIL # BLD AUTO: 0.01 K/UL — SIGNIFICANT CHANGE UP (ref 0–0.5)
EOSINOPHIL NFR BLD AUTO: 0.2 % — SIGNIFICANT CHANGE UP (ref 0–6)
FLUAV AG NPH QL: SIGNIFICANT CHANGE UP
FLUAV SUBTYP SPEC NAA+PROBE: SIGNIFICANT CHANGE UP
FLUBV AG NPH QL: SIGNIFICANT CHANGE UP
FLUBV RNA SPEC QL NAA+PROBE: SIGNIFICANT CHANGE UP
GLUCOSE SERPL-MCNC: 88 MG/DL — SIGNIFICANT CHANGE UP (ref 70–99)
HADV DNA SPEC QL NAA+PROBE: SIGNIFICANT CHANGE UP
HCOV 229E RNA SPEC QL NAA+PROBE: SIGNIFICANT CHANGE UP
HCOV HKU1 RNA SPEC QL NAA+PROBE: SIGNIFICANT CHANGE UP
HCOV NL63 RNA SPEC QL NAA+PROBE: SIGNIFICANT CHANGE UP
HCOV OC43 RNA SPEC QL NAA+PROBE: SIGNIFICANT CHANGE UP
HCT VFR BLD CALC: 35.3 % — LOW (ref 39–50)
HGB BLD-MCNC: 11.5 G/DL — LOW (ref 13–17)
HMPV RNA SPEC QL NAA+PROBE: DETECTED
HPIV1 RNA SPEC QL NAA+PROBE: SIGNIFICANT CHANGE UP
HPIV2 RNA SPEC QL NAA+PROBE: SIGNIFICANT CHANGE UP
HPIV3 RNA SPEC QL NAA+PROBE: SIGNIFICANT CHANGE UP
HPIV4 RNA SPEC QL NAA+PROBE: SIGNIFICANT CHANGE UP
IMM GRANULOCYTES # BLD AUTO: 0.02 K/UL — SIGNIFICANT CHANGE UP (ref 0–0.07)
IMM GRANULOCYTES NFR BLD AUTO: 0.4 % — SIGNIFICANT CHANGE UP (ref 0–0.9)
INR BLD: 1.08 RATIO — SIGNIFICANT CHANGE UP (ref 0.85–1.16)
LYMPHOCYTES # BLD AUTO: 0.98 K/UL — LOW (ref 1–3.3)
LYMPHOCYTES NFR BLD AUTO: 18.3 % — SIGNIFICANT CHANGE UP (ref 13–44)
M PNEUMO DNA SPEC QL NAA+PROBE: SIGNIFICANT CHANGE UP
MCHC RBC-ENTMCNC: 28.5 PG — SIGNIFICANT CHANGE UP (ref 27–34)
MCHC RBC-ENTMCNC: 32.6 G/DL — SIGNIFICANT CHANGE UP (ref 32–36)
MCV RBC AUTO: 87.4 FL — SIGNIFICANT CHANGE UP (ref 80–100)
MONOCYTES # BLD AUTO: 0.52 K/UL — SIGNIFICANT CHANGE UP (ref 0–0.9)
MONOCYTES NFR BLD AUTO: 9.7 % — SIGNIFICANT CHANGE UP (ref 2–14)
NEUTROPHILS # BLD AUTO: 3.82 K/UL — SIGNIFICANT CHANGE UP (ref 1.8–7.4)
NEUTROPHILS NFR BLD AUTO: 71.2 % — SIGNIFICANT CHANGE UP (ref 43–77)
NRBC # BLD AUTO: 0 K/UL — SIGNIFICANT CHANGE UP (ref 0–0)
NRBC # FLD: 0 K/UL — SIGNIFICANT CHANGE UP (ref 0–0)
NRBC BLD AUTO-RTO: 0 /100 WBCS — SIGNIFICANT CHANGE UP (ref 0–0)
NT-PROBNP SERPL-SCNC: 340 PG/ML — HIGH
PLATELET # BLD AUTO: 104 K/UL — LOW (ref 150–400)
PMV BLD: 10.7 FL — SIGNIFICANT CHANGE UP (ref 7–13)
POTASSIUM SERPL-MCNC: 4.4 MMOL/L — SIGNIFICANT CHANGE UP (ref 3.5–5.3)
POTASSIUM SERPL-SCNC: 4.4 MMOL/L — SIGNIFICANT CHANGE UP (ref 3.5–5.3)
PROCALCITONIN SERPL-MCNC: 0.14 NG/ML — HIGH (ref 0.02–0.1)
PROT SERPL-MCNC: 6.4 G/DL — SIGNIFICANT CHANGE UP (ref 6–8.3)
PROTHROM AB SERPL-ACNC: 12.5 SEC — SIGNIFICANT CHANGE UP (ref 9.9–13.4)
RAPID RVP RESULT: DETECTED
RBC # BLD: 4.04 M/UL — LOW (ref 4.2–5.8)
RBC # FLD: 14.2 % — SIGNIFICANT CHANGE UP (ref 10.3–14.5)
RSV RNA NPH QL NAA+NON-PROBE: SIGNIFICANT CHANGE UP
RSV RNA SPEC QL NAA+PROBE: SIGNIFICANT CHANGE UP
RV+EV RNA SPEC QL NAA+PROBE: SIGNIFICANT CHANGE UP
SARS-COV-2 RNA SPEC QL NAA+PROBE: SIGNIFICANT CHANGE UP
SARS-COV-2 RNA SPEC QL NAA+PROBE: SIGNIFICANT CHANGE UP
SODIUM SERPL-SCNC: 138 MMOL/L — SIGNIFICANT CHANGE UP (ref 135–145)
SOURCE RESPIRATORY: SIGNIFICANT CHANGE UP
TSH SERPL-MCNC: 1.87 UIU/ML — SIGNIFICANT CHANGE UP (ref 0.27–4.2)
WBC # BLD: 5.36 K/UL — SIGNIFICANT CHANGE UP (ref 3.8–10.5)
WBC # FLD AUTO: 5.36 K/UL — SIGNIFICANT CHANGE UP (ref 3.8–10.5)

## 2025-07-18 PROCEDURE — 99223 1ST HOSP IP/OBS HIGH 75: CPT

## 2025-07-18 PROCEDURE — 71045 X-RAY EXAM CHEST 1 VIEW: CPT | Mod: 26

## 2025-07-18 RX ORDER — PIPERACILLIN-TAZO-DEXTROSE,ISO 3.375G/5
3.38 IV SOLUTION, PIGGYBACK PREMIX FROZEN(ML) INTRAVENOUS EVERY 8 HOURS
Refills: 0 | Status: DISCONTINUED | OUTPATIENT
Start: 2025-07-18 | End: 2025-07-21

## 2025-07-18 RX ORDER — LEVOTHYROXINE SODIUM 300 MCG
1 TABLET ORAL
Refills: 0 | DISCHARGE

## 2025-07-18 RX ORDER — SERTRALINE 100 MG/1
25 TABLET, FILM COATED ORAL DAILY
Refills: 0 | Status: DISCONTINUED | OUTPATIENT
Start: 2025-07-18 | End: 2025-07-22

## 2025-07-18 RX ORDER — SERTRALINE 100 MG/1
1 TABLET, FILM COATED ORAL
Refills: 0 | DISCHARGE

## 2025-07-18 RX ORDER — FOLIC ACID 1 MG/1
1 TABLET ORAL DAILY
Refills: 0 | Status: DISCONTINUED | OUTPATIENT
Start: 2025-07-18 | End: 2025-07-19

## 2025-07-18 RX ORDER — HALOPERIDOL 10 MG/1
5 TABLET ORAL ONCE
Refills: 0 | Status: COMPLETED | OUTPATIENT
Start: 2025-07-18 | End: 2025-07-18

## 2025-07-18 RX ORDER — GABAPENTIN 400 MG/1
1 CAPSULE ORAL
Refills: 0 | DISCHARGE

## 2025-07-18 RX ORDER — OLANZAPINE 10 MG/1
1 TABLET ORAL
Refills: 0 | DISCHARGE

## 2025-07-18 RX ORDER — HALOPERIDOL 10 MG/1
1 TABLET ORAL
Refills: 0 | DISCHARGE

## 2025-07-18 RX ORDER — NALTREXONE HYDROCHLORIDE 50 MG/1
100 TABLET, FILM COATED ORAL DAILY
Refills: 0 | Status: DISCONTINUED | OUTPATIENT
Start: 2025-07-18 | End: 2025-07-22

## 2025-07-18 RX ORDER — SERTRALINE 100 MG/1
100 TABLET, FILM COATED ORAL DAILY
Refills: 0 | Status: DISCONTINUED | OUTPATIENT
Start: 2025-07-18 | End: 2025-07-22

## 2025-07-18 RX ORDER — FOLIC ACID 1 MG/1
1 TABLET ORAL DAILY
Refills: 0 | Status: DISCONTINUED | OUTPATIENT
Start: 2025-07-18 | End: 2025-07-18

## 2025-07-18 RX ORDER — ARIPIPRAZOLE 2 MG/1
30 TABLET ORAL DAILY
Refills: 0 | Status: DISCONTINUED | OUTPATIENT
Start: 2025-07-18 | End: 2025-07-22

## 2025-07-18 RX ORDER — FOLIC ACID 1 MG/1
1 TABLET ORAL
Refills: 0 | DISCHARGE

## 2025-07-18 RX ORDER — DIPHENHYDRAMINE HCL 12.5MG/5ML
1 ELIXIR ORAL
Refills: 0 | DISCHARGE

## 2025-07-18 RX ORDER — PIPERACILLIN-TAZO-DEXTROSE,ISO 3.375G/5
3.38 IV SOLUTION, PIGGYBACK PREMIX FROZEN(ML) INTRAVENOUS ONCE
Refills: 0 | Status: COMPLETED | OUTPATIENT
Start: 2025-07-18 | End: 2025-07-18

## 2025-07-18 RX ORDER — HALOPERIDOL 10 MG/1
10 TABLET ORAL DAILY
Refills: 0 | Status: DISCONTINUED | OUTPATIENT
Start: 2025-07-19 | End: 2025-07-22

## 2025-07-18 RX ORDER — ARIPIPRAZOLE 2 MG/1
1 TABLET ORAL
Refills: 0 | DISCHARGE

## 2025-07-18 RX ORDER — SENNA 187 MG
2 TABLET ORAL AT BEDTIME
Refills: 0 | Status: DISCONTINUED | OUTPATIENT
Start: 2025-07-18 | End: 2025-07-22

## 2025-07-18 RX ORDER — HALOPERIDOL 10 MG/1
5 TABLET ORAL AT BEDTIME
Refills: 0 | Status: DISCONTINUED | OUTPATIENT
Start: 2025-07-18 | End: 2025-07-22

## 2025-07-18 RX ORDER — UREA 10 %
1 LOTION (ML) TOPICAL
Refills: 0 | DISCHARGE

## 2025-07-18 RX ORDER — GABAPENTIN 400 MG/1
400 CAPSULE ORAL
Refills: 0 | Status: DISCONTINUED | OUTPATIENT
Start: 2025-07-18 | End: 2025-07-22

## 2025-07-18 RX ORDER — SENNA 187 MG
1 TABLET ORAL
Refills: 0 | DISCHARGE

## 2025-07-18 RX ORDER — LEVOTHYROXINE SODIUM 300 MCG
150 TABLET ORAL DAILY
Refills: 0 | Status: DISCONTINUED | OUTPATIENT
Start: 2025-07-18 | End: 2025-07-19

## 2025-07-18 RX ORDER — ENOXAPARIN SODIUM 100 MG/ML
40 INJECTION SUBCUTANEOUS EVERY 24 HOURS
Refills: 0 | Status: DISCONTINUED | OUTPATIENT
Start: 2025-07-18 | End: 2025-07-22

## 2025-07-18 RX ORDER — NALTREXONE HYDROCHLORIDE 50 MG/1
2 TABLET, FILM COATED ORAL
Refills: 0 | DISCHARGE

## 2025-07-18 RX ADMIN — Medication 25 GRAM(S): at 14:26

## 2025-07-18 RX ADMIN — HALOPERIDOL 5 MILLIGRAM(S): 10 TABLET ORAL at 21:55

## 2025-07-18 RX ADMIN — Medication 1000 MILLILITER(S): at 01:46

## 2025-07-18 RX ADMIN — Medication 200 GRAM(S): at 04:09

## 2025-07-18 RX ADMIN — Medication 25 GRAM(S): at 22:00

## 2025-07-19 LAB
ANION GAP SERPL CALC-SCNC: 14 MMOL/L — SIGNIFICANT CHANGE UP (ref 7–14)
BUN SERPL-MCNC: 15 MG/DL — SIGNIFICANT CHANGE UP (ref 7–23)
CALCIUM SERPL-MCNC: 8.5 MG/DL — SIGNIFICANT CHANGE UP (ref 8.4–10.5)
CHLORIDE SERPL-SCNC: 98 MMOL/L — SIGNIFICANT CHANGE UP (ref 98–107)
CO2 SERPL-SCNC: 24 MMOL/L — SIGNIFICANT CHANGE UP (ref 22–31)
CREAT SERPL-MCNC: 0.57 MG/DL — SIGNIFICANT CHANGE UP (ref 0.5–1.3)
EGFR: 104 ML/MIN/1.73M2 — SIGNIFICANT CHANGE UP
EGFR: 104 ML/MIN/1.73M2 — SIGNIFICANT CHANGE UP
GLUCOSE SERPL-MCNC: 77 MG/DL — SIGNIFICANT CHANGE UP (ref 70–99)
HCT VFR BLD CALC: 40.3 % — SIGNIFICANT CHANGE UP (ref 39–50)
HGB BLD-MCNC: 13.4 G/DL — SIGNIFICANT CHANGE UP (ref 13–17)
MAGNESIUM SERPL-MCNC: 2.3 MG/DL — SIGNIFICANT CHANGE UP (ref 1.6–2.6)
MCHC RBC-ENTMCNC: 28.9 PG — SIGNIFICANT CHANGE UP (ref 27–34)
MCHC RBC-ENTMCNC: 33.3 G/DL — SIGNIFICANT CHANGE UP (ref 32–36)
MCV RBC AUTO: 86.9 FL — SIGNIFICANT CHANGE UP (ref 80–100)
NRBC # BLD AUTO: 0 K/UL — SIGNIFICANT CHANGE UP (ref 0–0)
NRBC # FLD: 0 K/UL — SIGNIFICANT CHANGE UP (ref 0–0)
PHOSPHATE SERPL-MCNC: 3.3 MG/DL — SIGNIFICANT CHANGE UP (ref 2.5–4.5)
PLATELET # BLD AUTO: 78 K/UL — LOW (ref 150–400)
PMV BLD: SIGNIFICANT CHANGE UP FL (ref 7–13)
POTASSIUM SERPL-MCNC: 5.1 MMOL/L — SIGNIFICANT CHANGE UP (ref 3.5–5.3)
POTASSIUM SERPL-SCNC: 5.1 MMOL/L — SIGNIFICANT CHANGE UP (ref 3.5–5.3)
RBC # BLD: 4.64 M/UL — SIGNIFICANT CHANGE UP (ref 4.2–5.8)
RBC # FLD: SIGNIFICANT CHANGE UP % (ref 10.3–14.5)
SODIUM SERPL-SCNC: 136 MMOL/L — SIGNIFICANT CHANGE UP (ref 135–145)
WBC # BLD: 4.3 K/UL — SIGNIFICANT CHANGE UP (ref 3.8–10.5)
WBC # FLD AUTO: 4.3 K/UL — SIGNIFICANT CHANGE UP (ref 3.8–10.5)

## 2025-07-19 PROCEDURE — 99233 SBSQ HOSP IP/OBS HIGH 50: CPT

## 2025-07-19 RX ORDER — OLANZAPINE 10 MG/1
10 TABLET ORAL DAILY
Refills: 0 | Status: DISCONTINUED | OUTPATIENT
Start: 2025-07-19 | End: 2025-07-20

## 2025-07-19 RX ORDER — LEVOTHYROXINE SODIUM 300 MCG
150 TABLET ORAL DAILY
Refills: 0 | Status: DISCONTINUED | OUTPATIENT
Start: 2025-07-20 | End: 2025-07-22

## 2025-07-19 RX ORDER — LEVOTHYROXINE SODIUM 300 MCG
112 TABLET ORAL AT BEDTIME
Refills: 0 | Status: DISCONTINUED | OUTPATIENT
Start: 2025-07-19 | End: 2025-07-19

## 2025-07-19 RX ORDER — FOLIC ACID 1 MG/1
1 TABLET ORAL DAILY
Refills: 0 | Status: DISCONTINUED | OUTPATIENT
Start: 2025-07-19 | End: 2025-07-22

## 2025-07-19 RX ADMIN — Medication 25 GRAM(S): at 21:35

## 2025-07-19 RX ADMIN — SERTRALINE 25 MILLIGRAM(S): 100 TABLET, FILM COATED ORAL at 14:33

## 2025-07-19 RX ADMIN — Medication 25 GRAM(S): at 06:28

## 2025-07-19 RX ADMIN — FOLIC ACID 1 MILLIGRAM(S): 1 TABLET ORAL at 14:34

## 2025-07-19 RX ADMIN — Medication 55 MILLIGRAM(S): at 05:17

## 2025-07-19 RX ADMIN — NALTREXONE HYDROCHLORIDE 100 MILLIGRAM(S): 50 TABLET, FILM COATED ORAL at 14:33

## 2025-07-19 RX ADMIN — ARIPIPRAZOLE 30 MILLIGRAM(S): 2 TABLET ORAL at 14:33

## 2025-07-19 RX ADMIN — SERTRALINE 100 MILLIGRAM(S): 100 TABLET, FILM COATED ORAL at 14:33

## 2025-07-19 RX ADMIN — Medication 1000 MILLIGRAM(S): at 17:24

## 2025-07-19 RX ADMIN — GABAPENTIN 400 MILLIGRAM(S): 400 CAPSULE ORAL at 17:23

## 2025-07-19 RX ADMIN — Medication 25 GRAM(S): at 14:31

## 2025-07-19 RX ADMIN — HALOPERIDOL 10 MILLIGRAM(S): 10 TABLET ORAL at 14:32

## 2025-07-19 RX ADMIN — HALOPERIDOL 5 MILLIGRAM(S): 10 TABLET ORAL at 21:37

## 2025-07-19 RX ADMIN — ENOXAPARIN SODIUM 40 MILLIGRAM(S): 100 INJECTION SUBCUTANEOUS at 06:28

## 2025-07-19 RX ADMIN — Medication 55 MILLIGRAM(S): at 01:04

## 2025-07-19 RX ADMIN — Medication 2000 UNIT(S): at 14:33

## 2025-07-19 RX ADMIN — Medication 2 TABLET(S): at 21:37

## 2025-07-20 LAB
ALBUMIN SERPL ELPH-MCNC: 3.3 G/DL — SIGNIFICANT CHANGE UP (ref 3.3–5)
ALP SERPL-CCNC: 57 U/L — SIGNIFICANT CHANGE UP (ref 40–120)
ALT FLD-CCNC: 6 U/L — SIGNIFICANT CHANGE UP (ref 4–41)
ANION GAP SERPL CALC-SCNC: 13 MMOL/L — SIGNIFICANT CHANGE UP (ref 7–14)
ANION GAP SERPL CALC-SCNC: 14 MMOL/L — SIGNIFICANT CHANGE UP (ref 7–14)
APPEARANCE UR: CLEAR — SIGNIFICANT CHANGE UP
AST SERPL-CCNC: 13 U/L — SIGNIFICANT CHANGE UP (ref 4–40)
BILIRUB SERPL-MCNC: 0.2 MG/DL — SIGNIFICANT CHANGE UP (ref 0.2–1.2)
BILIRUB UR-MCNC: NEGATIVE — SIGNIFICANT CHANGE UP
BUN SERPL-MCNC: 10 MG/DL — SIGNIFICANT CHANGE UP (ref 7–23)
BUN SERPL-MCNC: 13 MG/DL — SIGNIFICANT CHANGE UP (ref 7–23)
CALCIUM SERPL-MCNC: 8.5 MG/DL — SIGNIFICANT CHANGE UP (ref 8.4–10.5)
CALCIUM SERPL-MCNC: 8.7 MG/DL — SIGNIFICANT CHANGE UP (ref 8.4–10.5)
CHLORIDE SERPL-SCNC: 99 MMOL/L — SIGNIFICANT CHANGE UP (ref 98–107)
CHLORIDE SERPL-SCNC: 99 MMOL/L — SIGNIFICANT CHANGE UP (ref 98–107)
CHOLEST SERPL-MCNC: 174 MG/DL — SIGNIFICANT CHANGE UP
CO2 SERPL-SCNC: 23 MMOL/L — SIGNIFICANT CHANGE UP (ref 22–31)
CO2 SERPL-SCNC: 27 MMOL/L — SIGNIFICANT CHANGE UP (ref 22–31)
COLOR SPEC: YELLOW — SIGNIFICANT CHANGE UP
CREAT SERPL-MCNC: 0.66 MG/DL — SIGNIFICANT CHANGE UP (ref 0.5–1.3)
CREAT SERPL-MCNC: 0.66 MG/DL — SIGNIFICANT CHANGE UP (ref 0.5–1.3)
DIFF PNL FLD: NEGATIVE — SIGNIFICANT CHANGE UP
EGFR: 100 ML/MIN/1.73M2 — SIGNIFICANT CHANGE UP
GLUCOSE SERPL-MCNC: 90 MG/DL — SIGNIFICANT CHANGE UP (ref 70–99)
GLUCOSE SERPL-MCNC: 98 MG/DL — SIGNIFICANT CHANGE UP (ref 70–99)
GLUCOSE UR QL: NEGATIVE MG/DL — SIGNIFICANT CHANGE UP
HCT VFR BLD CALC: 39.2 % — SIGNIFICANT CHANGE UP (ref 39–50)
HDLC SERPL-MCNC: 40 MG/DL — LOW
HGB BLD-MCNC: 13.2 G/DL — SIGNIFICANT CHANGE UP (ref 13–17)
KETONES UR QL: NEGATIVE MG/DL — SIGNIFICANT CHANGE UP
LDLC SERPL-MCNC: 119 MG/DL — HIGH
LEUKOCYTE ESTERASE UR-ACNC: NEGATIVE — SIGNIFICANT CHANGE UP
LIPID PNL WITH DIRECT LDL SERPL: 119 MG/DL — HIGH
MAGNESIUM SERPL-MCNC: 2.2 MG/DL — SIGNIFICANT CHANGE UP (ref 1.6–2.6)
MAGNESIUM SERPL-MCNC: 2.3 MG/DL — SIGNIFICANT CHANGE UP (ref 1.6–2.6)
MCHC RBC-ENTMCNC: 28.7 PG — SIGNIFICANT CHANGE UP (ref 27–34)
MCHC RBC-ENTMCNC: 33.7 G/DL — SIGNIFICANT CHANGE UP (ref 32–36)
MCV RBC AUTO: 85.2 FL — SIGNIFICANT CHANGE UP (ref 80–100)
NITRITE UR-MCNC: NEGATIVE — SIGNIFICANT CHANGE UP
NONHDLC SERPL-MCNC: 134 MG/DL — HIGH
NRBC # BLD AUTO: 0 K/UL — SIGNIFICANT CHANGE UP (ref 0–0)
NRBC # FLD: 0 K/UL — SIGNIFICANT CHANGE UP (ref 0–0)
NRBC BLD AUTO-RTO: 0 /100 WBCS — SIGNIFICANT CHANGE UP (ref 0–0)
PH UR: 8 — SIGNIFICANT CHANGE UP (ref 5–8)
PHOSPHATE SERPL-MCNC: 3.4 MG/DL — SIGNIFICANT CHANGE UP (ref 2.5–4.5)
PHOSPHATE SERPL-MCNC: 3.4 MG/DL — SIGNIFICANT CHANGE UP (ref 2.5–4.5)
PLATELET # BLD AUTO: 124 K/UL — LOW (ref 150–400)
PMV BLD: 10.5 FL — SIGNIFICANT CHANGE UP (ref 7–13)
POTASSIUM SERPL-MCNC: 4.5 MMOL/L — SIGNIFICANT CHANGE UP (ref 3.5–5.3)
POTASSIUM SERPL-MCNC: 4.8 MMOL/L — SIGNIFICANT CHANGE UP (ref 3.5–5.3)
POTASSIUM SERPL-SCNC: 4.5 MMOL/L — SIGNIFICANT CHANGE UP (ref 3.5–5.3)
POTASSIUM SERPL-SCNC: 4.8 MMOL/L — SIGNIFICANT CHANGE UP (ref 3.5–5.3)
PROT SERPL-MCNC: 6.9 G/DL — SIGNIFICANT CHANGE UP (ref 6–8.3)
PROT UR-MCNC: NEGATIVE MG/DL — SIGNIFICANT CHANGE UP
RBC # BLD: 4.6 M/UL — SIGNIFICANT CHANGE UP (ref 4.2–5.8)
RBC # FLD: 13.6 % — SIGNIFICANT CHANGE UP (ref 10.3–14.5)
SODIUM SERPL-SCNC: 136 MMOL/L — SIGNIFICANT CHANGE UP (ref 135–145)
SODIUM SERPL-SCNC: 139 MMOL/L — SIGNIFICANT CHANGE UP (ref 135–145)
SP GR SPEC: 1.01 — SIGNIFICANT CHANGE UP (ref 1–1.03)
TRIGL SERPL-MCNC: 83 MG/DL — SIGNIFICANT CHANGE UP
UROBILINOGEN FLD QL: 1 MG/DL — SIGNIFICANT CHANGE UP (ref 0.2–1)
WBC # BLD: 5.76 K/UL — SIGNIFICANT CHANGE UP (ref 3.8–10.5)
WBC # FLD AUTO: 5.76 K/UL — SIGNIFICANT CHANGE UP (ref 3.8–10.5)

## 2025-07-20 PROCEDURE — 99222 1ST HOSP IP/OBS MODERATE 55: CPT | Mod: GC

## 2025-07-20 PROCEDURE — 99233 SBSQ HOSP IP/OBS HIGH 50: CPT

## 2025-07-20 PROCEDURE — 93010 ELECTROCARDIOGRAM REPORT: CPT | Mod: 77

## 2025-07-20 RX ORDER — ATORVASTATIN CALCIUM 80 MG/1
40 TABLET, FILM COATED ORAL AT BEDTIME
Refills: 0 | Status: DISCONTINUED | OUTPATIENT
Start: 2025-07-20 | End: 2025-07-22

## 2025-07-20 RX ADMIN — FOLIC ACID 1 MILLIGRAM(S): 1 TABLET ORAL at 13:33

## 2025-07-20 RX ADMIN — Medication 2 TABLET(S): at 22:07

## 2025-07-20 RX ADMIN — GABAPENTIN 400 MILLIGRAM(S): 400 CAPSULE ORAL at 05:35

## 2025-07-20 RX ADMIN — GABAPENTIN 400 MILLIGRAM(S): 400 CAPSULE ORAL at 17:04

## 2025-07-20 RX ADMIN — Medication 2000 UNIT(S): at 13:32

## 2025-07-20 RX ADMIN — ENOXAPARIN SODIUM 40 MILLIGRAM(S): 100 INJECTION SUBCUTANEOUS at 05:37

## 2025-07-20 RX ADMIN — Medication 25 GRAM(S): at 22:07

## 2025-07-20 RX ADMIN — Medication 25 GRAM(S): at 05:35

## 2025-07-20 RX ADMIN — HALOPERIDOL 10 MILLIGRAM(S): 10 TABLET ORAL at 13:33

## 2025-07-20 RX ADMIN — Medication 150 MICROGRAM(S): at 05:36

## 2025-07-20 RX ADMIN — ATORVASTATIN CALCIUM 40 MILLIGRAM(S): 80 TABLET, FILM COATED ORAL at 22:07

## 2025-07-20 RX ADMIN — Medication 25 GRAM(S): at 13:31

## 2025-07-20 RX ADMIN — Medication 1000 MILLIGRAM(S): at 05:36

## 2025-07-20 RX ADMIN — NALTREXONE HYDROCHLORIDE 100 MILLIGRAM(S): 50 TABLET, FILM COATED ORAL at 13:33

## 2025-07-20 RX ADMIN — SERTRALINE 100 MILLIGRAM(S): 100 TABLET, FILM COATED ORAL at 13:32

## 2025-07-20 RX ADMIN — SERTRALINE 25 MILLIGRAM(S): 100 TABLET, FILM COATED ORAL at 13:32

## 2025-07-20 RX ADMIN — Medication 1000 MILLIGRAM(S): at 17:03

## 2025-07-20 RX ADMIN — ARIPIPRAZOLE 30 MILLIGRAM(S): 2 TABLET ORAL at 13:49

## 2025-07-20 RX ADMIN — HALOPERIDOL 5 MILLIGRAM(S): 10 TABLET ORAL at 22:06

## 2025-07-21 DIAGNOSIS — R00.1 BRADYCARDIA, UNSPECIFIED: ICD-10-CM

## 2025-07-21 DIAGNOSIS — Z29.9 ENCOUNTER FOR PROPHYLACTIC MEASURES, UNSPECIFIED: ICD-10-CM

## 2025-07-21 LAB
ANION GAP SERPL CALC-SCNC: 13 MMOL/L — SIGNIFICANT CHANGE UP (ref 7–14)
BUN SERPL-MCNC: 10 MG/DL — SIGNIFICANT CHANGE UP (ref 7–23)
CALCIUM SERPL-MCNC: 8.5 MG/DL — SIGNIFICANT CHANGE UP (ref 8.4–10.5)
CHLORIDE SERPL-SCNC: 98 MMOL/L — SIGNIFICANT CHANGE UP (ref 98–107)
CO2 SERPL-SCNC: 27 MMOL/L — SIGNIFICANT CHANGE UP (ref 22–31)
CREAT SERPL-MCNC: 0.69 MG/DL — SIGNIFICANT CHANGE UP (ref 0.5–1.3)
EGFR: 98 ML/MIN/1.73M2 — SIGNIFICANT CHANGE UP
EGFR: 98 ML/MIN/1.73M2 — SIGNIFICANT CHANGE UP
GLUCOSE SERPL-MCNC: 80 MG/DL — SIGNIFICANT CHANGE UP (ref 70–99)
HCT VFR BLD CALC: 40.9 % — SIGNIFICANT CHANGE UP (ref 39–50)
HGB BLD-MCNC: 13.3 G/DL — SIGNIFICANT CHANGE UP (ref 13–17)
MAGNESIUM SERPL-MCNC: 2.2 MG/DL — SIGNIFICANT CHANGE UP (ref 1.6–2.6)
MCHC RBC-ENTMCNC: 28.2 PG — SIGNIFICANT CHANGE UP (ref 27–34)
MCHC RBC-ENTMCNC: 32.5 G/DL — SIGNIFICANT CHANGE UP (ref 32–36)
MCV RBC AUTO: 86.8 FL — SIGNIFICANT CHANGE UP (ref 80–100)
NRBC # BLD AUTO: 0 K/UL — SIGNIFICANT CHANGE UP (ref 0–0)
NRBC # FLD: 0 K/UL — SIGNIFICANT CHANGE UP (ref 0–0)
NRBC BLD AUTO-RTO: 0 /100 WBCS — SIGNIFICANT CHANGE UP (ref 0–0)
PHOSPHATE SERPL-MCNC: 3.4 MG/DL — SIGNIFICANT CHANGE UP (ref 2.5–4.5)
PLATELET # BLD AUTO: 142 K/UL — LOW (ref 150–400)
PMV BLD: 10.1 FL — SIGNIFICANT CHANGE UP (ref 7–13)
POTASSIUM SERPL-MCNC: 4.2 MMOL/L — SIGNIFICANT CHANGE UP (ref 3.5–5.3)
POTASSIUM SERPL-SCNC: 4.2 MMOL/L — SIGNIFICANT CHANGE UP (ref 3.5–5.3)
RBC # BLD: 4.71 M/UL — SIGNIFICANT CHANGE UP (ref 4.2–5.8)
RBC # FLD: 13.8 % — SIGNIFICANT CHANGE UP (ref 10.3–14.5)
SODIUM SERPL-SCNC: 138 MMOL/L — SIGNIFICANT CHANGE UP (ref 135–145)
WBC # BLD: 5.23 K/UL — SIGNIFICANT CHANGE UP (ref 3.8–10.5)
WBC # FLD AUTO: 5.23 K/UL — SIGNIFICANT CHANGE UP (ref 3.8–10.5)

## 2025-07-21 PROCEDURE — 99232 SBSQ HOSP IP/OBS MODERATE 35: CPT

## 2025-07-21 RX ORDER — HALOPERIDOL 10 MG/1
2.5 TABLET ORAL ONCE
Refills: 0 | Status: DISCONTINUED | OUTPATIENT
Start: 2025-07-21 | End: 2025-07-22

## 2025-07-21 RX ORDER — HALOPERIDOL 10 MG/1
2.5 TABLET ORAL ONCE
Refills: 0 | Status: COMPLETED | OUTPATIENT
Start: 2025-07-21 | End: 2025-07-21

## 2025-07-21 RX ADMIN — ARIPIPRAZOLE 30 MILLIGRAM(S): 2 TABLET ORAL at 13:07

## 2025-07-21 RX ADMIN — SERTRALINE 25 MILLIGRAM(S): 100 TABLET, FILM COATED ORAL at 13:08

## 2025-07-21 RX ADMIN — Medication 2000 UNIT(S): at 13:07

## 2025-07-21 RX ADMIN — Medication 1000 MILLIGRAM(S): at 05:21

## 2025-07-21 RX ADMIN — ATORVASTATIN CALCIUM 40 MILLIGRAM(S): 80 TABLET, FILM COATED ORAL at 21:27

## 2025-07-21 RX ADMIN — ENOXAPARIN SODIUM 40 MILLIGRAM(S): 100 INJECTION SUBCUTANEOUS at 06:18

## 2025-07-21 RX ADMIN — HALOPERIDOL 10 MILLIGRAM(S): 10 TABLET ORAL at 13:06

## 2025-07-21 RX ADMIN — SERTRALINE 100 MILLIGRAM(S): 100 TABLET, FILM COATED ORAL at 13:08

## 2025-07-21 RX ADMIN — HALOPERIDOL 5 MILLIGRAM(S): 10 TABLET ORAL at 21:27

## 2025-07-21 RX ADMIN — Medication 25 GRAM(S): at 13:25

## 2025-07-21 RX ADMIN — HALOPERIDOL 2.5 MILLIGRAM(S): 10 TABLET ORAL at 01:27

## 2025-07-21 RX ADMIN — Medication 2 TABLET(S): at 21:27

## 2025-07-21 RX ADMIN — GABAPENTIN 400 MILLIGRAM(S): 400 CAPSULE ORAL at 05:20

## 2025-07-21 RX ADMIN — GABAPENTIN 400 MILLIGRAM(S): 400 CAPSULE ORAL at 17:44

## 2025-07-21 RX ADMIN — Medication 150 MICROGRAM(S): at 05:21

## 2025-07-21 RX ADMIN — Medication 1000 MILLIGRAM(S): at 17:42

## 2025-07-21 RX ADMIN — NALTREXONE HYDROCHLORIDE 100 MILLIGRAM(S): 50 TABLET, FILM COATED ORAL at 13:06

## 2025-07-21 RX ADMIN — Medication 25 GRAM(S): at 05:20

## 2025-07-21 RX ADMIN — FOLIC ACID 1 MILLIGRAM(S): 1 TABLET ORAL at 13:06

## 2025-07-22 ENCOUNTER — TRANSCRIPTION ENCOUNTER (OUTPATIENT)
Age: 70
End: 2025-07-22

## 2025-07-22 ENCOUNTER — RESULT REVIEW (OUTPATIENT)
Age: 70
End: 2025-07-22

## 2025-07-22 VITALS
HEART RATE: 56 BPM | DIASTOLIC BLOOD PRESSURE: 50 MMHG | RESPIRATION RATE: 17 BRPM | OXYGEN SATURATION: 99 % | TEMPERATURE: 97 F | SYSTOLIC BLOOD PRESSURE: 112 MMHG

## 2025-07-22 LAB
LEGIONELLA AG UR QL: NEGATIVE — SIGNIFICANT CHANGE UP
S PNEUM AG UR QL: NEGATIVE — SIGNIFICANT CHANGE UP

## 2025-07-22 PROCEDURE — 93306 TTE W/DOPPLER COMPLETE: CPT | Mod: 26

## 2025-07-22 PROCEDURE — 99239 HOSP IP/OBS DSCHRG MGMT >30: CPT

## 2025-07-22 RX ORDER — ATORVASTATIN CALCIUM 80 MG/1
1 TABLET, FILM COATED ORAL
Qty: 0 | Refills: 0 | DISCHARGE
Start: 2025-07-22

## 2025-07-22 RX ADMIN — ENOXAPARIN SODIUM 40 MILLIGRAM(S): 100 INJECTION SUBCUTANEOUS at 05:48

## 2025-07-22 RX ADMIN — SERTRALINE 100 MILLIGRAM(S): 100 TABLET, FILM COATED ORAL at 11:47

## 2025-07-22 RX ADMIN — GABAPENTIN 400 MILLIGRAM(S): 400 CAPSULE ORAL at 17:26

## 2025-07-22 RX ADMIN — Medication 1000 MILLIGRAM(S): at 19:09

## 2025-07-22 RX ADMIN — FOLIC ACID 1 MILLIGRAM(S): 1 TABLET ORAL at 11:47

## 2025-07-22 RX ADMIN — NALTREXONE HYDROCHLORIDE 100 MILLIGRAM(S): 50 TABLET, FILM COATED ORAL at 11:47

## 2025-07-22 RX ADMIN — Medication 2000 UNIT(S): at 11:46

## 2025-07-22 RX ADMIN — SERTRALINE 25 MILLIGRAM(S): 100 TABLET, FILM COATED ORAL at 11:47

## 2025-07-22 RX ADMIN — ARIPIPRAZOLE 30 MILLIGRAM(S): 2 TABLET ORAL at 11:47

## 2025-07-22 RX ADMIN — Medication 1000 MILLIGRAM(S): at 05:50

## 2025-07-22 RX ADMIN — Medication 150 MICROGRAM(S): at 05:49

## 2025-07-22 RX ADMIN — GABAPENTIN 400 MILLIGRAM(S): 400 CAPSULE ORAL at 05:50

## 2025-07-22 RX ADMIN — HALOPERIDOL 10 MILLIGRAM(S): 10 TABLET ORAL at 11:47

## 2025-07-23 LAB
CULTURE RESULTS: SIGNIFICANT CHANGE UP
CULTURE RESULTS: SIGNIFICANT CHANGE UP
SPECIMEN SOURCE: SIGNIFICANT CHANGE UP
SPECIMEN SOURCE: SIGNIFICANT CHANGE UP